# Patient Record
Sex: FEMALE | Race: WHITE | Employment: FULL TIME | URBAN - METROPOLITAN AREA
[De-identification: names, ages, dates, MRNs, and addresses within clinical notes are randomized per-mention and may not be internally consistent; named-entity substitution may affect disease eponyms.]

---

## 2024-03-23 ENCOUNTER — HOSPITAL ENCOUNTER (OUTPATIENT)
Age: 44
Discharge: HOME | End: 2024-03-23
Payer: COMMERCIAL

## 2024-03-23 VITALS
OXYGEN SATURATION: 98 % | SYSTOLIC BLOOD PRESSURE: 162 MMHG | HEART RATE: 92 BPM | RESPIRATION RATE: 19 BRPM | TEMPERATURE: 98 F | DIASTOLIC BLOOD PRESSURE: 94 MMHG

## 2024-03-23 DIAGNOSIS — J02.9 VIRAL PHARYNGITIS: Primary | ICD-10-CM

## 2024-03-23 LAB — S PYO AG THROAT QL IA.RAPID: NEGATIVE

## 2024-03-23 PROCEDURE — 99202 OFFICE O/P NEW SF 15 MIN: CPT

## 2024-03-23 PROCEDURE — 87651 STREP A DNA AMP PROBE: CPT | Performed by: NURSE PRACTITIONER

## 2024-03-23 NOTE — ED PROVIDER NOTES
Patient Seen in: [facility]      History     Chief Complaint   Patient presents with    Sore Throat     Stated Complaint: sore throat    Subjective:   HPI    43-year-old female presents with sore throat times today.  She is afebrile in no acute distress.    Objective:   No pertinent past medical history.            No pertinent past surgical history.              No pertinent social history.            Review of Systems    Positive for stated complaint: sore throat  Other systems are as noted in HPI.  Constitutional and vital signs reviewed.      All other systems reviewed and negative except as noted above.    Physical Exam     ED Triage Vitals [03/23/24 1523]   BP (!) 162/94   Pulse 92   Resp 19   Temp 98.4 °F (36.9 °C)   Temp src Temporal   SpO2 98 %   O2 Device None (Room air)       Current:BP (!) 162/94   Pulse 92   Temp 98.4 °F (36.9 °C) (Temporal)   Resp 19   LMP 03/11/2024 (Approximate)   SpO2 98%         Physical Exam  Vitals reviewed.   Constitutional:       General: She is not in acute distress.  HENT:      Right Ear: Tympanic membrane and ear canal normal.      Left Ear: Tympanic membrane and ear canal normal.      Nose: No congestion or rhinorrhea.      Mouth/Throat:      Mouth: No oral lesions.      Pharynx: Uvula midline. Posterior oropharyngeal erythema present. No pharyngeal swelling, oropharyngeal exudate or uvula swelling.      Tonsils: No tonsillar exudate or tonsillar abscesses.   Cardiovascular:      Rate and Rhythm: Normal rate and regular rhythm.   Pulmonary:      Effort: Pulmonary effort is normal.      Breath sounds: Normal breath sounds.   Musculoskeletal:      Cervical back: Normal range of motion and neck supple.   Lymphadenopathy:      Cervical: No cervical adenopathy.   Skin:     General: Skin is warm and dry.   Neurological:      General: No focal deficit present.      Mental Status: She is alert and oriented to person, place, and time.   Psychiatric:         Mood  and Affect: Mood normal.         Behavior: Behavior normal.               ED Course     Labs Reviewed   RAPID STREP A                      MDM                                         Medical Decision Making  43-year-old female presents with sore throat.  Differential diagnosis includes viral pharyngitis versus strep pharyngitis.  POC strep is negative.  Patient was discharged and given printed instructions for care of viral pharyngitis.    Amount and/or Complexity of Data Reviewed  Labs: ordered.     Details: POC strep is negative    Risk  OTC drugs.        Disposition and Plan     Clinical Impression:  1. Viral pharyngitis         Disposition:  Discharge  3/23/2024  3:34 pm    Follow-up:  No follow-up provider specified.        Medications Prescribed:  There are no discharge medications for this patient.